# Patient Record
Sex: MALE | Race: WHITE | NOT HISPANIC OR LATINO | Employment: UNEMPLOYED | URBAN - METROPOLITAN AREA
[De-identification: names, ages, dates, MRNs, and addresses within clinical notes are randomized per-mention and may not be internally consistent; named-entity substitution may affect disease eponyms.]

---

## 2017-01-16 DIAGNOSIS — E08.40 DIABETES MELLITUS DUE TO UNDERLYING CONDITION WITH DIABETIC NEUROPATHY, WITH LONG-TERM CURRENT USE OF INSULIN (HCC): ICD-10-CM

## 2017-01-16 DIAGNOSIS — Z79.4 DIABETES MELLITUS DUE TO UNDERLYING CONDITION WITH DIABETIC NEUROPATHY, WITH LONG-TERM CURRENT USE OF INSULIN (HCC): ICD-10-CM

## 2017-02-14 RX ORDER — GABAPENTIN 800 MG/1
TABLET ORAL
Qty: 90 TAB | Refills: 2 | Status: SHIPPED | OUTPATIENT
Start: 2017-02-14 | End: 2017-06-28 | Stop reason: SDUPTHER

## 2017-02-14 RX ORDER — SULFAMETHOXAZOLE AND TRIMETHOPRIM 800; 160 MG/1; MG/1
TABLET ORAL
OUTPATIENT
Start: 2017-02-14

## 2017-03-20 RX ORDER — IBUPROFEN 800 MG/1
TABLET ORAL
Qty: 30 TAB | Refills: 2 | Status: SHIPPED | OUTPATIENT
Start: 2017-03-20 | End: 2017-06-28 | Stop reason: SDUPTHER

## 2017-04-27 NOTE — TELEPHONE ENCOUNTER
Was the patient seen in the last year in this department? Yes     Does patient have an active prescription for medications requested? No     Received Request Via: Pharmacy

## 2017-05-01 RX ORDER — CALCIUM CITRATE/VITAMIN D3 200MG-6.25
TABLET ORAL
Qty: 100 STRIP | Refills: 1 | Status: SHIPPED | OUTPATIENT
Start: 2017-05-01 | End: 2017-08-28

## 2017-05-25 ENCOUNTER — APPOINTMENT (OUTPATIENT)
Dept: RADIOLOGY | Facility: MEDICAL CENTER | Age: 52
End: 2017-05-25
Attending: PHYSICIAN ASSISTANT
Payer: MEDICAID

## 2017-05-30 DIAGNOSIS — E11.42 DIABETIC POLYNEUROPATHY ASSOCIATED WITH TYPE 2 DIABETES MELLITUS (HCC): ICD-10-CM

## 2017-05-30 RX ORDER — PREGABALIN 100 MG/1
100 CAPSULE ORAL 2 TIMES DAILY
Qty: 60 CAP | Refills: 3 | Status: SHIPPED | OUTPATIENT
Start: 2017-05-30 | End: 2017-10-16 | Stop reason: SDUPTHER

## 2017-05-30 RX ORDER — GABAPENTIN 800 MG/1
800 TABLET ORAL 3 TIMES DAILY
Qty: 90 TAB | Refills: 2 | Status: SHIPPED | OUTPATIENT
Start: 2017-05-30 | End: 2017-08-28 | Stop reason: SDUPTHER

## 2017-05-30 NOTE — TELEPHONE ENCOUNTER
Was the patient seen in the last year in this department? Yes     Does patient have an active prescription for medications requested? No     Received Request Via: Pharmacy   Future Appointments       Provider Department [place]    6/15/2017 11:30 AM Jesenia VERGARA  2 [facility] IMAG[facility] - ULTRASOUND - [address]

## 2017-05-31 NOTE — TELEPHONE ENCOUNTER
Was the patient seen in the last year in this department? Yes     Does patient have an active prescription for medications requested? No     Received Request Via: Pharmacy   Future Appointments       Provider Department [place]    6/15/2017 11:30 AM Jesenia VERGARA  2 [facility] - ULTRASOUND - [address]

## 2017-06-15 ENCOUNTER — HOSPITAL ENCOUNTER (OUTPATIENT)
Dept: RADIOLOGY | Facility: MEDICAL CENTER | Age: 52
End: 2017-06-15
Attending: PHYSICIAN ASSISTANT
Payer: MEDICAID

## 2017-06-15 DIAGNOSIS — B18.2 HEP C W/O COMA, CHRONIC (HCC): ICD-10-CM

## 2017-06-15 PROCEDURE — 76705 ECHO EXAM OF ABDOMEN: CPT

## 2017-06-28 RX ORDER — PEN NEEDLE, DIABETIC 32GX 5/32"
NEEDLE, DISPOSABLE MISCELLANEOUS
Qty: 100 EACH | Refills: 2 | Status: SHIPPED | OUTPATIENT
Start: 2017-06-28 | End: 2017-12-18 | Stop reason: SDUPTHER

## 2017-06-28 RX ORDER — INSULIN GLARGINE 100 [IU]/ML
INJECTION, SOLUTION SUBCUTANEOUS
Qty: 1 PEN | Refills: 2 | Status: SHIPPED | OUTPATIENT
Start: 2017-06-28 | End: 2017-08-28

## 2017-06-28 RX ORDER — GABAPENTIN 800 MG/1
TABLET ORAL
Qty: 90 TAB | Refills: 1 | Status: SHIPPED | OUTPATIENT
Start: 2017-06-28 | End: 2017-08-28

## 2017-06-28 RX ORDER — IBUPROFEN 800 MG/1
TABLET ORAL
Qty: 30 TAB | Refills: 1 | Status: SHIPPED | OUTPATIENT
Start: 2017-06-28 | End: 2017-08-07 | Stop reason: SDUPTHER

## 2017-07-14 ENCOUNTER — HOSPITAL ENCOUNTER (OUTPATIENT)
Dept: LAB | Facility: MEDICAL CENTER | Age: 52
End: 2017-07-14
Attending: PHYSICIAN ASSISTANT
Payer: MEDICAID

## 2017-07-14 LAB
ALBUMIN SERPL BCP-MCNC: 4 G/DL (ref 3.2–4.9)
ALBUMIN/GLOB SERPL: 1.1 G/DL
ALP SERPL-CCNC: 120 U/L (ref 30–99)
ALT SERPL-CCNC: 15 U/L (ref 2–50)
ANION GAP SERPL CALC-SCNC: 11 MMOL/L (ref 0–11.9)
AST SERPL-CCNC: 18 U/L (ref 12–45)
BILIRUB SERPL-MCNC: 0.9 MG/DL (ref 0.1–1.5)
BUN SERPL-MCNC: 15 MG/DL (ref 8–22)
CALCIUM SERPL-MCNC: 9.5 MG/DL (ref 8.5–10.5)
CHLORIDE SERPL-SCNC: 101 MMOL/L (ref 96–112)
CO2 SERPL-SCNC: 23 MMOL/L (ref 20–33)
CREAT SERPL-MCNC: 1.49 MG/DL (ref 0.5–1.4)
GFR SERPL CREATININE-BSD FRML MDRD: 50 ML/MIN/1.73 M 2
GLOBULIN SER CALC-MCNC: 3.5 G/DL (ref 1.9–3.5)
GLUCOSE SERPL-MCNC: 177 MG/DL (ref 65–99)
POTASSIUM SERPL-SCNC: 3.6 MMOL/L (ref 3.6–5.5)
PROT SERPL-MCNC: 7.5 G/DL (ref 6–8.2)
SODIUM SERPL-SCNC: 135 MMOL/L (ref 135–145)

## 2017-07-14 PROCEDURE — 80053 COMPREHEN METABOLIC PANEL: CPT

## 2017-07-14 PROCEDURE — 36415 COLL VENOUS BLD VENIPUNCTURE: CPT

## 2017-07-19 ENCOUNTER — HOSPITAL ENCOUNTER (OUTPATIENT)
Dept: RADIOLOGY | Facility: MEDICAL CENTER | Age: 52
End: 2017-07-19
Attending: PHYSICIAN ASSISTANT
Payer: MEDICAID

## 2017-07-19 DIAGNOSIS — R93.2 NONVISUALIZATION OF GALLBLADDER: ICD-10-CM

## 2017-07-19 PROCEDURE — 74181 MRI ABDOMEN W/O CONTRAST: CPT

## 2017-08-07 RX ORDER — IBUPROFEN 800 MG/1
TABLET ORAL
Qty: 30 TAB | Refills: 0 | Status: SHIPPED | OUTPATIENT
Start: 2017-08-07 | End: 2017-10-16 | Stop reason: SDUPTHER

## 2017-08-28 ENCOUNTER — OFFICE VISIT (OUTPATIENT)
Dept: MEDICAL GROUP | Facility: MEDICAL CENTER | Age: 52
End: 2017-08-28
Attending: NURSE PRACTITIONER
Payer: MEDICAID

## 2017-08-28 VITALS
RESPIRATION RATE: 16 BRPM | OXYGEN SATURATION: 94 % | BODY MASS INDEX: 23.06 KG/M2 | HEIGHT: 73 IN | TEMPERATURE: 98.1 F | WEIGHT: 174 LBS | DIASTOLIC BLOOD PRESSURE: 60 MMHG | SYSTOLIC BLOOD PRESSURE: 100 MMHG | HEART RATE: 84 BPM

## 2017-08-28 DIAGNOSIS — Z23 NEED FOR TDAP VACCINATION: ICD-10-CM

## 2017-08-28 DIAGNOSIS — Z91.09 ENVIRONMENTAL ALLERGIES: ICD-10-CM
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
## 2017-08-28 DIAGNOSIS — L08.9 SKIN INFECTION: ICD-10-CM

## 2017-08-28 DIAGNOSIS — N40.0 BENIGN NON-NODULAR PROSTATIC HYPERPLASIA, PRESENCE OF LOWER URINARY TRACT SYMPTOMS UNSPECIFIED: ICD-10-CM

## 2017-08-28 DIAGNOSIS — F33.9 EPISODE OF RECURRENT MAJOR DEPRESSIVE DISORDER, UNSPECIFIED DEPRESSION EPISODE SEVERITY (HCC): ICD-10-CM

## 2017-08-28 DIAGNOSIS — F51.01 PRIMARY INSOMNIA: ICD-10-CM

## 2017-08-28 DIAGNOSIS — Z12.11 SCREEN FOR COLON CANCER: ICD-10-CM

## 2017-08-28 DIAGNOSIS — E11.9 TYPE 2 DIABETES MELLITUS WITHOUT COMPLICATION, UNSPECIFIED LONG TERM INSULIN USE STATUS: ICD-10-CM

## 2017-08-28 LAB — GLUCOSE BLD-MCNC: 421 MG/DL (ref 70–100)

## 2017-08-28 PROCEDURE — 90471 IMMUNIZATION ADMIN: CPT | Performed by: NURSE PRACTITIONER

## 2017-08-28 PROCEDURE — 90715 TDAP VACCINE 7 YRS/> IM: CPT | Performed by: NURSE PRACTITIONER

## 2017-08-28 PROCEDURE — 82948 REAGENT STRIP/BLOOD GLUCOSE: CPT | Performed by: NURSE PRACTITIONER

## 2017-08-28 PROCEDURE — 99213 OFFICE O/P EST LOW 20 MIN: CPT | Mod: 25 | Performed by: NURSE PRACTITIONER

## 2017-08-28 PROCEDURE — 99214 OFFICE O/P EST MOD 30 MIN: CPT | Mod: 25 | Performed by: NURSE PRACTITIONER

## 2017-08-28 RX ORDER — SULFAMETHOXAZOLE AND TRIMETHOPRIM 800; 160 MG/1; MG/1
1 TABLET ORAL 2 TIMES DAILY
Qty: 20 TAB | Refills: 0 | Status: SHIPPED | OUTPATIENT
Start: 2017-08-28 | End: 2017-10-16

## 2017-08-28 RX ORDER — GABAPENTIN 800 MG/1
800 TABLET ORAL 3 TIMES DAILY
Qty: 90 TAB | Refills: 2 | Status: SHIPPED | OUTPATIENT
Start: 2017-08-28 | End: 2017-10-16 | Stop reason: SDUPTHER

## 2017-08-28 RX ORDER — TAMSULOSIN HYDROCHLORIDE 0.4 MG/1
0.8 CAPSULE ORAL
Qty: 60 CAP | Refills: 5 | Status: SHIPPED | OUTPATIENT
Start: 2017-08-28 | End: 2017-10-16 | Stop reason: SDUPTHER

## 2017-08-28 RX ORDER — FLUTICASONE PROPIONATE 50 MCG
1 SPRAY, SUSPENSION (ML) NASAL DAILY
Qty: 16 G | Refills: 11 | Status: SHIPPED | OUTPATIENT
Start: 2017-08-28 | End: 2017-10-16 | Stop reason: SDUPTHER

## 2017-08-28 RX ORDER — HYDROXYZINE HYDROCHLORIDE 25 MG/1
25 TABLET, FILM COATED ORAL NIGHTLY PRN
Qty: 30 TAB | Refills: 2 | Status: SHIPPED | OUTPATIENT
Start: 2017-08-28 | End: 2017-10-16 | Stop reason: SDUPTHER

## 2017-08-28 ASSESSMENT — PATIENT HEALTH QUESTIONNAIRE - PHQ9
SUM OF ALL RESPONSES TO PHQ QUESTIONS 1-9: 26
5. POOR APPETITE OR OVEREATING: 3 - NEARLY EVERY DAY
CLINICAL INTERPRETATION OF PHQ2 SCORE: 6

## 2017-08-28 ASSESSMENT — PAIN SCALES - GENERAL: PAINLEVEL: NO PAIN

## 2017-08-28 NOTE — ASSESSMENT & PLAN NOTE
"Jose reports has had multiple spider bites to left arm.  Has had some redness to forearm,   Denies fever. \"Lanced it\" and pus came out.  Is less in size.  "

## 2017-08-28 NOTE — ASSESSMENT & PLAN NOTE
Reports increased stress.  Is not sleeping well.  Discussed avoiding caffeine  Will trial Atarax.  Pt not interested in counseling or Psychiatry despite my recommendation  Due to his insomnia and depression.  Denies suicidal ideation.

## 2017-08-28 NOTE — PROGRESS NOTES
"  Chief Complaint: f/u on DM, Med refills, \"spider bite\"    HPI:  Jose presents to the clinic after a long absence (last seen 16) for follow up on DM  And \"spider bite\",    His PMH includes    Depression  Poorly Controlled DM-2  Diabetic Neuropathy  Erectile Dysfunction  Constipation  Low Back Pain  BPH and Rectal Pain     Hepatitis C, Treated by GI consultants  GERD  Elevated LFT's     Meth and Marijuana Abuse hx     Rash     Established with   Endocrine    GI Consultants  [facility] [place]- Maurilio Tariq     Review of Records shows  16 Clinic visit, Tx for Bronchitis  16 Visit with [facility]- Reports 10 days left on 24 wk tx for hep C tx. Rash resolving F/u 4 months    Nexium 40 mg Daily RX renewal.  16 Clinic visit for Refill of Insulin and Lyrica RX    REsults REview:  16 Last A1c= 8.1 ( ~ 186).   17 BS= 177  17 MRI ABd shows Dilated pancreatic duct.     [place]  Report shows  No entries.      Diabetes mellitus type 2, uncontrolled  Pt last A1c= 8.1 on 17.  He reports he has not been taking very good care of self due to depressed as wife passed away last month. Dog passed away last week.  Today is 421= Bs in clinic today.  Pt reprots not taking meds.  S/S  Basaglar pen 35 at hs, S/S humalog.      Insect bite  Jose reports has had multiple spider bites to left arm.  Has had some redness to forearm,   Denies fever. \"Lanced it\" and pus came out.  Is less in size.    Depression  Pt has a hx of depression.  He has been tx w Lexapro. States not taking for many months.  States it did not help much.  He reports his wife  last month  And his dog  also recently.  He scores high on depression score in clinic today.  Denies suicidal ideation, but states not very motivated  To take his DM meds or check his BS.  Pt offered  Both Psychiatry and Psychology referral but not interested.  I recommend re-start of anti-depressant, but Pt not wanting to.  I will refer to [facility] " in hopes Pt will change his mind about   Talking and counseling about his wife and depression.    Primary insomnia  Reports increased stress.  Is not sleeping well.  Discussed avoiding caffeine  Will trial Atarax.  Pt not interested in counseling or Psychiatry despite my recommendation  Due to his insomnia and depression.  Denies suicidal ideation.      Patient Active Problem List    Diagnosis Date Noted   • Diabetes mellitus type 2, uncontrolled (CMS-HCC) 03/11/2011     Priority: Medium   • Insect bite 08/28/2017   • Primary insomnia 08/28/2017   • Wound of left upper extremity 08/25/2016   • Cough 05/18/2016   • Vitamin D deficiency 10/14/2015   • Tobacco use 09/24/2015   • Diabetic polyneuropathy associated with type 2 diabetes mellitus (CMS-HCC) 09/24/2015   • Hyponatremia 08/15/2015   • Upper back pain 03/03/2015   • Depression 12/07/2012   • Erectile dysfunction 12/07/2012   • Hyperlipidemia 12/07/2012   • Hep C w/o coma, chronic (CMS-HCC) 02/23/2011   • Elevated liver enzymes 02/23/2011   • GERD (gastroesophageal reflux disease) 02/23/2011   • Constipation, chronic 02/23/2011   • BPH (benign prostatic hyperplasia) 02/23/2011   • DM (diabetes mellitus) (CMS-HCC) 02/23/2011       Allergies:Nkda [no known drug allergy]    Current Outpatient Prescriptions   Medication Sig Dispense Refill   • fluticasone (FLONASE ALLERGY RELIEF) 50 MCG/ACT nasal spray Spray 1 Spray in nose every day. 16 g 11   • tamsulosin (FLOMAX) 0.4 MG capsule Take 2 Caps by mouth ONE-HALF HOUR AFTER BREAKFAST. 60 Cap 5   • gabapentin (NEURONTIN) 800 MG tablet Take 1 Tab by mouth 3 times a day. 90 Tab 2   • sulfamethoxazole-trimethoprim (BACTRIM DS) 800-160 MG tablet Take 1 Tab by mouth 2 times a day. 20 Tab 0   • hydrOXYzine (ATARAX) 25 MG Tab Take 1 Tab by mouth at bedtime as needed. 30 Tab 2   • ibuprofen (MOTRIN) 800 MG Tab TAKE 1/2 TABLET BY MOUTH EVERY 12 HOURS IF NEEDED 30 Tab 0   • esomeprazole (NEXIUM) 20 MG capsule TAKE 1 CAPSULE  "ORALLY EVERY MORNING BEFORE BREAKFAST 30 Cap 4   • BD PEN NEEDLE JACEY U/F 32G X 4 MM Misc USE AS DIRECTED WITH INSULIN 100 Each 2   • insulin glargine (BASAGLAR KWIKPEN) 100 UNIT/ML Solution Pen-injector injection Inject 35 Units as instructed every evening. 3 PEN 3   • pregabalin (LYRICA) 100 MG Cap Take 1 Cap by mouth 2 times a day. 60 Cap 3   • Insulin Pen Needle 29G X 12.7MM Misc #100 pen needles to go with Insulin pens 100 Each 3   • glucose blood (TRUE METRIX BLOOD GLUCOSE TEST) strip 1 Strip by Other route 4 times a day. 120 Strip 5   • insulin regular (HUMULIN R) 100 Unit/mL Solution Sliding scale as per Endocrine 10 mL 10   • amitriptyline (ELAVIL) 50 MG Tab Take 1 Tab by mouth every bedtime. 30 Tab 10   • Lancets Misc by Does not apply route 2 Times a Day. ... 60 Each 11   • metformin (GLUCOPHAGE) 1000 MG tablet TAKE 1 TABLET ORALLY 2 TIMES DAILY 60 Tab 3   • INSULIN SYRINGE 1CC/29G 29G X 1/2\" 1 ML MISC For use with lantus 30 Each 11     No current facility-administered medications for this visit.        Social History   Substance Use Topics   • Smoking status: Current Every Day Smoker     Packs/day: 0.50     Years: 25.00     Types: Cigarettes   • Smokeless tobacco: Never Used      Comment: 1/2 ppd since 1983   • Alcohol use No       Family History   Problem Relation Age of Onset   • Cancer Mother    • Cancer Father    • Heart Disease Brother    • Lung Disease Neg Hx    • Diabetes Neg Hx    • Stroke Neg Hx        ROS:  Review of Systems   See HPI Above        Exam:  Blood pressure 100/60, pulse 84, temperature 36.7 °C (98.1 °F), resp. rate 16, height 1.854 m (6' 0.99\"), weight 78.9 kg (174 lb), SpO2 94 %.  General:  Well nourished, well developed male in NAD  HENT:Head is grossly normal. PERRL.  Neck: Supple. Trachea is midline.  Pulmonary: Clear to ausculation .  Normal effort. No rales, ronchi, or wheezing.   Cardiovascular: Regular rate and rhythm.  Abdomen-Abdomen is soft, No tenderness.  Upper " extremities- Strong = . Good ROM. Small amt of redness to left forearm, with scab. No drainage, mild tenderness.  No tenderness to left axilla.  Lower extremities- neg for edema, redness, tenderness.  Neuro- A & O x 4. Speech clear and appropriate.  Psych-Sad, tired affect.      Current medications, allergies, and problem list reviewed with patient and updated in  Psychiatric today.    Assessment/Plan:  1. Type 2 diabetes mellitus without complication, unspecified long term insulin use status (CMS-HCC)  POCT Glucose= 421 in clinic today  Long discussion w patient about re-starting checking his fasting am BS and starting his Basaglar at night and working towards adding back in his S/S insulin with meals.    COMP METABOLIC PANEL    HEMOGLOBIN A1C    LIPID PROFILE    MICROALBUMIN CREAT RATIO URINE (LAB COLLECT)   2. Screen for colon cancer  OCCULT BLOOD FECES IMMUNOASSAY   3. Insect bite, initial encounter     4. Environmental allergies  fluticasone (FLONASE ALLERGY RELIEF) 50 MCG/ACT nasal spray   5. Benign non-nodular prostatic hyperplasia, presence of lower urinary tract symptoms unspecified  tamsulosin (FLOMAX) 0.4 MG capsule  Increase in dose to 0.8 mg/day as Pt states in past his Urologist, Maurilio had increased his dose to this and it works better.   6. Need for Tdap vaccination  TDAP VACCINE =>6YO IM   7. Skin infection  sulfamethoxazole-trimethoprim (BACTRIM DS) 800-160 MG tablet  Wound care referred.  Polysporin and bandaide to left forearm red area.   8. Primary insomnia  hydrOXYzine (ATARAX) 25 MG Tab    REFERRAL TO PSYCHOLOGY-[facility]   9. Episode of recurrent major depressive disorder, unspecified depression episode severity (CMS-HCC)  Patient has been identified as being depressed and appropriate orders and counseling have been given    REFERRAL TO PSYCHOLOGY   Follow up in 6 weeks to review labs and f/u on his depression.. Call or return if questions, concerns, or worsening condition.

## 2017-08-28 NOTE — ASSESSMENT & PLAN NOTE
Pt has a hx of depression.  He has been tx w Lexapro. States not taking for many months.  States it did not help much.  He reports his wife  last month  And his dog  also recently.  He scores high on depression score in clinic today.

## 2017-08-28 NOTE — ASSESSMENT & PLAN NOTE
Pt last A1c= 8.1 on 2/23/17.  He reports he has not been taking very good care of self due to depressed as wife passed away last month. Dog passed away last week.  Today is 421= Bs in clinic today.  Pt reprots not taking meds.  S/S  Basaglar pen 35 at hs, S/S humalog.

## 2017-10-11 ENCOUNTER — HOSPITAL ENCOUNTER (OUTPATIENT)
Dept: LAB | Facility: MEDICAL CENTER | Age: 52
End: 2017-10-11
Attending: PHYSICIAN ASSISTANT
Payer: MEDICAID

## 2017-10-11 ENCOUNTER — HOSPITAL ENCOUNTER (OUTPATIENT)
Dept: LAB | Facility: MEDICAL CENTER | Age: 52
End: 2017-10-11
Attending: NURSE PRACTITIONER
Payer: MEDICAID

## 2017-10-11 DIAGNOSIS — E11.9 TYPE 2 DIABETES MELLITUS WITHOUT COMPLICATION, UNSPECIFIED LONG TERM INSULIN USE STATUS: ICD-10-CM

## 2017-10-11 LAB
ALBUMIN SERPL BCP-MCNC: 3.8 G/DL (ref 3.2–4.9)
ALBUMIN SERPL BCP-MCNC: 3.9 G/DL (ref 3.2–4.9)
ALBUMIN/GLOB SERPL: 1.2 G/DL
ALBUMIN/GLOB SERPL: 1.2 G/DL
ALP SERPL-CCNC: 117 U/L (ref 30–99)
ALP SERPL-CCNC: 117 U/L (ref 30–99)
ALT SERPL-CCNC: 12 U/L (ref 2–50)
ALT SERPL-CCNC: 12 U/L (ref 2–50)
ANION GAP SERPL CALC-SCNC: 7 MMOL/L (ref 0–11.9)
ANION GAP SERPL CALC-SCNC: 8 MMOL/L (ref 0–11.9)
AST SERPL-CCNC: 14 U/L (ref 12–45)
AST SERPL-CCNC: 15 U/L (ref 12–45)
BASOPHILS # BLD AUTO: 0.8 % (ref 0–1.8)
BASOPHILS # BLD: 0.06 K/UL (ref 0–0.12)
BILIRUB SERPL-MCNC: 0.6 MG/DL (ref 0.1–1.5)
BILIRUB SERPL-MCNC: 0.7 MG/DL (ref 0.1–1.5)
BUN SERPL-MCNC: 13 MG/DL (ref 8–22)
BUN SERPL-MCNC: 13 MG/DL (ref 8–22)
CALCIUM SERPL-MCNC: 9.3 MG/DL (ref 8.5–10.5)
CALCIUM SERPL-MCNC: 9.4 MG/DL (ref 8.5–10.5)
CHLORIDE SERPL-SCNC: 101 MMOL/L (ref 96–112)
CHLORIDE SERPL-SCNC: 101 MMOL/L (ref 96–112)
CHOLEST SERPL-MCNC: 135 MG/DL (ref 100–199)
CO2 SERPL-SCNC: 24 MMOL/L (ref 20–33)
CO2 SERPL-SCNC: 25 MMOL/L (ref 20–33)
CREAT SERPL-MCNC: 1.18 MG/DL (ref 0.5–1.4)
CREAT SERPL-MCNC: 1.23 MG/DL (ref 0.5–1.4)
CREAT UR-MCNC: 66.5 MG/DL
EOSINOPHIL # BLD AUTO: 0.11 K/UL (ref 0–0.51)
EOSINOPHIL NFR BLD: 1.4 % (ref 0–6.9)
ERYTHROCYTE [DISTWIDTH] IN BLOOD BY AUTOMATED COUNT: 44.9 FL (ref 35.9–50)
EST. AVERAGE GLUCOSE BLD GHB EST-MCNC: 280 MG/DL
GFR SERPL CREATININE-BSD FRML MDRD: >60 ML/MIN/1.73 M 2
GFR SERPL CREATININE-BSD FRML MDRD: >60 ML/MIN/1.73 M 2
GLOBULIN SER CALC-MCNC: 3.3 G/DL (ref 1.9–3.5)
GLOBULIN SER CALC-MCNC: 3.3 G/DL (ref 1.9–3.5)
GLUCOSE SERPL-MCNC: 286 MG/DL (ref 65–99)
GLUCOSE SERPL-MCNC: 293 MG/DL (ref 65–99)
HBA1C MFR BLD: 11.4 % (ref 0–5.6)
HCT VFR BLD AUTO: 59.6 % (ref 42–52)
HDLC SERPL-MCNC: 47 MG/DL
HGB BLD-MCNC: 20.7 G/DL (ref 14–18)
IMM GRANULOCYTES # BLD AUTO: 0.02 K/UL (ref 0–0.11)
IMM GRANULOCYTES NFR BLD AUTO: 0.3 % (ref 0–0.9)
INR PPP: 1.02 (ref 0.87–1.13)
LDLC SERPL CALC-MCNC: 67 MG/DL
LYMPHOCYTES # BLD AUTO: 2.27 K/UL (ref 1–4.8)
LYMPHOCYTES NFR BLD: 29.3 % (ref 22–41)
MCH RBC QN AUTO: 30.3 PG (ref 27–33)
MCHC RBC AUTO-ENTMCNC: 34.7 G/DL (ref 33.7–35.3)
MCV RBC AUTO: 87.3 FL (ref 81.4–97.8)
MICROALBUMIN UR-MCNC: 19.1 MG/DL
MICROALBUMIN/CREAT UR: 287 MG/G (ref 0–30)
MONOCYTES # BLD AUTO: 0.34 K/UL (ref 0–0.85)
MONOCYTES NFR BLD AUTO: 4.4 % (ref 0–13.4)
NEUTROPHILS # BLD AUTO: 4.94 K/UL (ref 1.82–7.42)
NEUTROPHILS NFR BLD: 63.8 % (ref 44–72)
NRBC # BLD AUTO: 0 K/UL
NRBC BLD AUTO-RTO: 0 /100 WBC
PLATELET # BLD AUTO: 104 K/UL (ref 164–446)
PMV BLD AUTO: 11.2 FL (ref 9–12.9)
POTASSIUM SERPL-SCNC: 4.3 MMOL/L (ref 3.6–5.5)
POTASSIUM SERPL-SCNC: 4.3 MMOL/L (ref 3.6–5.5)
PROT SERPL-MCNC: 7.1 G/DL (ref 6–8.2)
PROT SERPL-MCNC: 7.2 G/DL (ref 6–8.2)
PROTHROMBIN TIME: 13.7 SEC (ref 12–14.6)
RBC # BLD AUTO: 6.83 M/UL (ref 4.7–6.1)
SODIUM SERPL-SCNC: 133 MMOL/L (ref 135–145)
SODIUM SERPL-SCNC: 133 MMOL/L (ref 135–145)
TRIGL SERPL-MCNC: 103 MG/DL (ref 0–149)
WBC # BLD AUTO: 7.7 K/UL (ref 4.8–10.8)

## 2017-10-11 PROCEDURE — 80053 COMPREHEN METABOLIC PANEL: CPT | Mod: 91

## 2017-10-11 PROCEDURE — 83036 HEMOGLOBIN GLYCOSYLATED A1C: CPT

## 2017-10-11 PROCEDURE — 85025 COMPLETE CBC W/AUTO DIFF WBC: CPT

## 2017-10-11 PROCEDURE — 86235 NUCLEAR ANTIGEN ANTIBODY: CPT | Mod: 91

## 2017-10-11 PROCEDURE — 85610 PROTHROMBIN TIME: CPT

## 2017-10-11 PROCEDURE — 87522 HEPATITIS C REVRS TRNSCRPJ: CPT

## 2017-10-11 PROCEDURE — 80053 COMPREHEN METABOLIC PANEL: CPT

## 2017-10-11 PROCEDURE — 86038 ANTINUCLEAR ANTIBODIES: CPT

## 2017-10-11 PROCEDURE — 36415 COLL VENOUS BLD VENIPUNCTURE: CPT

## 2017-10-11 PROCEDURE — 86225 DNA ANTIBODY NATIVE: CPT

## 2017-10-11 PROCEDURE — 80061 LIPID PANEL: CPT

## 2017-10-11 PROCEDURE — 82043 UR ALBUMIN QUANTITATIVE: CPT

## 2017-10-11 PROCEDURE — 82570 ASSAY OF URINE CREATININE: CPT

## 2017-10-12 DIAGNOSIS — D75.1 POLYCYTHEMIA: ICD-10-CM

## 2017-10-12 DIAGNOSIS — D69.6 THROMBOCYTOPENIA (HCC): ICD-10-CM

## 2017-10-13 LAB
HCV RNA SERPL NAA+PROBE-ACNC: NORMAL IU/ML
NUCLEAR IGG SER QL IA: NORMAL
TEST INFO  93644: NORMAL

## 2017-10-16 ENCOUNTER — OFFICE VISIT (OUTPATIENT)
Dept: MEDICAL GROUP | Facility: MEDICAL CENTER | Age: 52
End: 2017-10-16
Attending: NURSE PRACTITIONER
Payer: MEDICAID

## 2017-10-16 VITALS
HEIGHT: 73 IN | RESPIRATION RATE: 16 BRPM | OXYGEN SATURATION: 96 % | HEART RATE: 94 BPM | TEMPERATURE: 97.9 F | BODY MASS INDEX: 23.59 KG/M2 | SYSTOLIC BLOOD PRESSURE: 102 MMHG | DIASTOLIC BLOOD PRESSURE: 70 MMHG | WEIGHT: 178 LBS

## 2017-10-16 DIAGNOSIS — Z79.4 DIABETES MELLITUS DUE TO UNDERLYING CONDITION WITH DIABETIC NEUROPATHY, WITH LONG-TERM CURRENT USE OF INSULIN (HCC): ICD-10-CM

## 2017-10-16 DIAGNOSIS — E11.42 DIABETIC POLYNEUROPATHY ASSOCIATED WITH TYPE 2 DIABETES MELLITUS (HCC): ICD-10-CM

## 2017-10-16 DIAGNOSIS — L29.9 ITCHY SKIN: ICD-10-CM

## 2017-10-16 DIAGNOSIS — F51.01 PRIMARY INSOMNIA: ICD-10-CM

## 2017-10-16 DIAGNOSIS — Z91.09 ENVIRONMENTAL ALLERGIES: ICD-10-CM

## 2017-10-16 DIAGNOSIS — K21.00 GASTROESOPHAGEAL REFLUX DISEASE WITH ESOPHAGITIS: ICD-10-CM

## 2017-10-16 DIAGNOSIS — F32.89 OTHER DEPRESSION: ICD-10-CM

## 2017-10-16 DIAGNOSIS — Z23 NEED FOR PNEUMOCOCCAL VACCINE: ICD-10-CM

## 2017-10-16 DIAGNOSIS — E08.40 DIABETES MELLITUS DUE TO UNDERLYING CONDITION WITH DIABETIC NEUROPATHY, WITH LONG-TERM CURRENT USE OF INSULIN (HCC): ICD-10-CM

## 2017-10-16 DIAGNOSIS — Z23 NEED FOR INFLUENZA VACCINATION: ICD-10-CM

## 2017-10-16 PROCEDURE — 90471 IMMUNIZATION ADMIN: CPT | Performed by: NURSE PRACTITIONER

## 2017-10-16 PROCEDURE — 99214 OFFICE O/P EST MOD 30 MIN: CPT | Mod: 25 | Performed by: NURSE PRACTITIONER

## 2017-10-16 PROCEDURE — 90686 IIV4 VACC NO PRSV 0.5 ML IM: CPT | Performed by: NURSE PRACTITIONER

## 2017-10-16 PROCEDURE — 99213 OFFICE O/P EST LOW 20 MIN: CPT | Mod: 25 | Performed by: NURSE PRACTITIONER

## 2017-10-16 PROCEDURE — 90732 PPSV23 VACC 2 YRS+ SUBQ/IM: CPT | Performed by: NURSE PRACTITIONER

## 2017-10-16 RX ORDER — TAMSULOSIN HYDROCHLORIDE 0.4 MG/1
0.8 CAPSULE ORAL
Qty: 60 CAP | Refills: 5 | Status: SHIPPED | OUTPATIENT
Start: 2017-10-16

## 2017-10-16 RX ORDER — PREGABALIN 100 MG/1
100 CAPSULE ORAL 2 TIMES DAILY
Qty: 60 CAP | Refills: 3 | Status: SHIPPED | OUTPATIENT
Start: 2017-10-16 | End: 2017-12-18 | Stop reason: SDUPTHER

## 2017-10-16 RX ORDER — AMITRIPTYLINE HYDROCHLORIDE 50 MG/1
50 TABLET, FILM COATED ORAL
Qty: 30 TAB | Refills: 10 | Status: SHIPPED | OUTPATIENT
Start: 2017-10-16

## 2017-10-16 RX ORDER — FLUTICASONE PROPIONATE 50 MCG
1 SPRAY, SUSPENSION (ML) NASAL DAILY
Qty: 16 G | Refills: 11 | Status: SHIPPED | OUTPATIENT
Start: 2017-10-16

## 2017-10-16 RX ORDER — LORATADINE 10 MG/1
10 TABLET ORAL DAILY
Qty: 30 TAB | Refills: 2 | Status: SHIPPED | OUTPATIENT
Start: 2017-10-16 | End: 2017-12-18 | Stop reason: SDUPTHER

## 2017-10-16 RX ORDER — IBUPROFEN 800 MG/1
400 TABLET ORAL EVERY 12 HOURS PRN
Qty: 30 TAB | Refills: 0 | Status: SHIPPED | OUTPATIENT
Start: 2017-10-16 | End: 2017-11-27 | Stop reason: SDUPTHER

## 2017-10-16 RX ORDER — HYDROXYZINE HYDROCHLORIDE 25 MG/1
25 TABLET, FILM COATED ORAL NIGHTLY PRN
Qty: 30 TAB | Refills: 2 | Status: SHIPPED | OUTPATIENT
Start: 2017-10-16 | End: 2017-12-18 | Stop reason: SDUPTHER

## 2017-10-16 RX ORDER — GABAPENTIN 800 MG/1
800 TABLET ORAL 3 TIMES DAILY
Qty: 90 TAB | Refills: 2 | Status: SHIPPED | OUTPATIENT
Start: 2017-10-16 | End: 2017-12-18 | Stop reason: SDUPTHER

## 2017-10-16 RX ORDER — TRAZODONE HYDROCHLORIDE 50 MG/1
50-100 TABLET ORAL NIGHTLY PRN
Qty: 60 TAB | Refills: 1 | Status: SHIPPED | OUTPATIENT
Start: 2017-10-16 | End: 2017-12-18 | Stop reason: SDUPTHER

## 2017-10-16 NOTE — PROGRESS NOTES
"    Chief Complaint: Trouble sleeping, REsults of Labs    HPI:  Jose presents to the clinic for lab results and f/u on DM.    His PMH includes    Depression  Poorly Controlled DM-2  Diabetic Neuropathy  Erectile Dysfunction  Constipation  Low Back Pain  BPH and Rectal Pain     Hepatitis C, Treated by GI consultants  GERD  Elevated LFT's  Meth and Marijuana Abuse hx    Rash     Previously Established with   Endocrine    [facility]  [facility] [place]- Maurilio Tariq     Recent REferral Approved-   Psyhcology- Counseling- [facility]  Hematology- [facility]      Review of Records shows  10/12/17 Referral placed to Hematologist for Jose's Polycythemia, Thrombocytopenia  17 Clinic visit for f/u on poorly controlled DM, Med Refills, Recent death of family member and Pt not taking care of self. FSBS in clinic= 401.  Labs ordered. Pt to restart DM Meds ( metformin, S/S INsulin, nightly Basaglar. Bactrim DS for spider bite wound, Atarax RX for sleep.  Referred to [facility] for counseling ( depression/Grief). Labs ordered.  16 Clinic visit, Tx for Bronchitis  16 Visit with [facility]- Reports 10 days left on 24 wk tx for hep C tx. Rash resolving F/u 4 months    Nexium 40 mg Daily RX renewal.  16 Clinic visit for Refill of Insulin and Lyrica RX     REsults REview:  10/11/17 Labs-  CBC shows Hgb= 20.7, Hct= 59.6, Platelets= 104 ( Polycythemia, Thrombocytopenia).                              CMP normal except sodium= 133, BS= 293, Alk Phos= 117. A1C= 11.4 ( ~ 280 avg BS)                              Microalb/Cr urine Ratio elevated= 287, Cholesterol Total= 135, Zgiiavkr=618, HDL= 47, LDl= 67    16 Last A1c= 8.1 ( ~ 186).   17 BS= 177  17 MRI ABd shows Dilated pancreatic duct.     [place]  Report shows  No entries.    Depression  Pt has been depressed and worse since his wife  in July.  Reports \"doing okay\". Not wanting medication for depression.  Is taking Elavil 50 " mg at hs for DM neuropathy pains.  Reports is having trouble sleeping.  Denies suicidal ideation.      Primary insomnia  Pt reported poor sleep and depression. Grief at August visit.  Not amenable to Psychiatry or counseling.  RX for Atarax. REferral for counseling placed anyway by me.    Jose Reports Hydroxyzine did not help w sleep.  States Elavil helps him sleep but not to get to sleep.    Diabetes mellitus type 2, uncontrolled  We reviewed his labs including high fasting BS and high A1c.  Pt reports not taking Metformin most days. Does not like the way it makes you feeling. Is using Lantus 35 units at night. Rare use of Sliding Scale INsulin.    Has not seen Endocrine MD in over 1 1-1/2 yrs.     Hyperlipidemia  We reviewed his recent good Lipid Profile.  Not on Statin.         Patient Active Problem List    Diagnosis Date Noted   • Diabetes mellitus type 2, uncontrolled (CMS-HCC) 03/11/2011     Priority: Medium   • Polycythemia 10/12/2017   • Thrombocytopenia (CMS-HCC) 10/12/2017   • Insect bite 08/28/2017   • Primary insomnia 08/28/2017   • Wound of left upper extremity 08/25/2016   • Cough 05/18/2016   • Vitamin D deficiency 10/14/2015   • Tobacco use 09/24/2015   • Diabetic polyneuropathy associated with type 2 diabetes mellitus (CMS-HCC) 09/24/2015   • Hyponatremia 08/15/2015   • Upper back pain 03/03/2015   • Depression 12/07/2012   • Erectile dysfunction 12/07/2012   • Hyperlipidemia 12/07/2012   • Hep C w/o coma, chronic (CMS-HCC) 02/23/2011   • Elevated liver enzymes 02/23/2011   • GERD (gastroesophageal reflux disease) 02/23/2011   • Constipation, chronic 02/23/2011   • BPH (benign prostatic hyperplasia) 02/23/2011   • DM (diabetes mellitus) (CMS-HCC) 02/23/2011       Allergies:Nkda [no known drug allergy]    Current Outpatient Prescriptions   Medication Sig Dispense Refill   • fluticasone (FLONASE ALLERGY RELIEF) 50 MCG/ACT nasal spray Spray 1 Spray in nose every day. 16 g 11   • tamsulosin (FLOMAX)  "0.4 MG capsule Take 2 Caps by mouth ONE-HALF HOUR AFTER BREAKFAST. 60 Cap 5   • gabapentin (NEURONTIN) 800 MG tablet Take 1 Tab by mouth 3 times a day. 90 Tab 2   • hydrOXYzine HCl (ATARAX) 25 MG Tab Take 1 Tab by mouth at bedtime as needed. 30 Tab 2   • ibuprofen (MOTRIN) 800 MG Tab Take 0.5 Tabs by mouth every 12 hours as needed. 30 Tab 0   • esomeprazole (NEXIUM) 20 MG capsule Take 1 Cap by mouth every day. 30 Cap 5   • insulin glargine (BASAGLAR KWIKPEN) 100 UNIT/ML Solution Pen-injector injection Inject 35 Units as instructed every evening. 3 PEN 3   • pregabalin (LYRICA) 100 MG Cap Take 1 Cap by mouth 2 times a day. 60 Cap 3   • glucose blood (TRUE METRIX BLOOD GLUCOSE TEST) strip 1 Strip by Other route 4 times a day. 120 Strip 5   • insulin regular (HUMULIN R) 100 Unit/mL Solution Sliding scale as per Endocrine 10 mL 10   • amitriptyline (ELAVIL) 50 MG Tab Take 1 Tab by mouth every bedtime. 30 Tab 10   • trazodone (DESYREL) 50 MG Tab Take 1-2 Tabs by mouth at bedtime as needed for Sleep. 60 Tab 1   • loratadine (CLARITIN) 10 MG Tab Take 1 Tab by mouth every day. 30 Tab 2   • BD PEN NEEDLE JACEY U/F 32G X 4 MM Misc USE AS DIRECTED WITH INSULIN 100 Each 2   • Insulin Pen Needle 29G X 12.7MM Misc #100 pen needles to go with Insulin pens 100 Each 3   • Lancets Misc by Does not apply route 2 Times a Day. ... 60 Each 11   • metformin (GLUCOPHAGE) 1000 MG tablet TAKE 1 TABLET ORALLY 2 TIMES DAILY 60 Tab 3   • INSULIN SYRINGE 1CC/29G 29G X 1/2\" 1 ML MISC For use with lantus 30 Each 11     No current facility-administered medications for this visit.        Social History   Substance Use Topics   • Smoking status: Current Every Day Smoker     Packs/day: 0.50     Years: 25.00     Types: Cigarettes   • Smokeless tobacco: Never Used      Comment: 1/2 ppd since 1983   • Alcohol use No       Family History   Problem Relation Age of Onset   • Cancer Mother    • Cancer Father    • Heart Disease Brother    • Lung Disease Neg Hx " "   • Diabetes Neg Hx    • Stroke Neg Hx        ROS:  Review of Systems   See HPI Above        Exam:  Blood pressure 102/70, pulse 94, temperature 36.6 °C (97.9 °F), resp. rate 16, height 1.854 m (6' 0.99\"), weight 80.7 kg (178 lb), SpO2 96 %.  General:  Well nourished, well developed male in NAD  HENT:Head is grossly normal. PERRL.  Neck: Supple. Trachea is midline.  Pulmonary: Clear to ausculation .  Normal effort. No rales, ronchi, or wheezing.   Cardiovascular: Regular rate and rhythm.  Abdomen-Abdomen is soft, No tenderness.  Upper extremities- Strong = . Good ROM  Lower extremities- neg for edema, redness, tenderness.  Neuro- A & O x 4. Speech clear and appropriate.     Current medications, allergies, and problem list reviewed with patient and updated in  [facility] today.    Assessment/Plan:  1. Primary insomnia  hydrOXYzine HCl (ATARAX) 25 MG Tab    REFERRAL TO PSYCHIATRY    trazodone (DESYREL) 50 MG Tab   2. Environmental allergies  fluticasone (FLONASE ALLERGY RELIEF) 50 MCG/ACT nasal spray-REfill   3. Diabetic polyneuropathy associated with type 2 diabetes mellitus (CMS-HCC)  ibuprofen (MOTRIN) 800 MG Tab-REfill    pregabalin (LYRICA) 100 MG Cap-REfill    amitriptyline (ELAVIL) 50 MG Tab-REfill   4. Diabetes mellitus due to underlying condition with diabetic neuropathy, with long-term current use of insulin (CMS-HCC)  insulin glargine (BASAGLAR KWIKPEN) 100 UNIT/ML Solution Pen-injector injection-REfill    glucose blood (TRUE METRIX BLOOD GLUCOSE TEST) strip- REfill    insulin regular (HUMULIN R) 100 Unit/mL Solution-REfill    REFERRAL TO ENDOCRINOLOGY   5. Gastroesophageal reflux disease with esophagitis  esomeprazole (NEXIUM) 20 MG capsule-REfill   6. Need for influenza vaccination  INFLUENZA VACCINE QUAD INJ >3Y(PF)   7. Need for pneumococcal vaccine  Pneumococal Polysaccharide Vaccine 23-Valent =>1yo SQ/IM   8. Other depression  REFERRAL TO PSYCHIATRY  Pt instructed to call [facility] for counseling " appt and importance of this therapy.   9. Itchy skin  loratadine (CLARITIN) 10 MG Tab/Atarax prn.   Follow up in 6 weeks.. Call or return if questions, concerns, or worsening condition.

## 2017-10-16 NOTE — ASSESSMENT & PLAN NOTE
"Pt has been depressed and worse since his wife  in July.  Reports \"doing okay\". Not wanting medication for depression.  Is taking Elavil 50 mg at hs for DM neuropathy pains.  Reports is having trouble sleeping.  Denies suicidal ideation.  "

## 2017-10-16 NOTE — ASSESSMENT & PLAN NOTE
Pt reported poor sleep and depression. Grief at August visit.  Not amenable to Psychiatry or counseling.  RX for Atarax. REferral for counseling placed anyway by me.    Jose Reports Hydroxyzine did not help w sleep.  States Elavil helps him sleep but not to get to sleep.

## 2017-10-16 NOTE — ASSESSMENT & PLAN NOTE
We reviewed his labs including high fasting BS and high A1c.  Pt reports not taking Metformin most days. Does not like the way it makes you feeling. Is using Lantus 35 units at night. Rare use of Sliding Scale INsulin.    Has not seen Endocrine MD in over 1 1-1/2 yrs.

## 2017-10-17 RX ORDER — IBUPROFEN 200 MG
TABLET ORAL
COMMUNITY
End: 2017-10-17 | Stop reason: SDUPTHER

## 2017-10-17 RX ORDER — IBUPROFEN 200 MG
TABLET ORAL
Qty: 30 EACH | Refills: 3 | Status: SHIPPED | OUTPATIENT
Start: 2017-10-17 | End: 2017-12-18 | Stop reason: SDUPTHER

## 2017-11-08 ENCOUNTER — HOSPITAL ENCOUNTER (OUTPATIENT)
Facility: MEDICAL CENTER | Age: 52
End: 2017-11-08
Attending: INTERNAL MEDICINE
Payer: MEDICAID

## 2017-11-08 LAB
ALBUMIN SERPL BCP-MCNC: 3.7 G/DL (ref 3.2–4.9)
ALBUMIN/GLOB SERPL: 1.2 G/DL
ALP SERPL-CCNC: 123 U/L (ref 30–99)
ALT SERPL-CCNC: 18 U/L (ref 2–50)
ANION GAP SERPL CALC-SCNC: 8 MMOL/L (ref 0–11.9)
AST SERPL-CCNC: 14 U/L (ref 12–45)
BILIRUB SERPL-MCNC: 0.5 MG/DL (ref 0.1–1.5)
BUN SERPL-MCNC: 11 MG/DL (ref 8–22)
CALCIUM SERPL-MCNC: 9.2 MG/DL (ref 8.5–10.5)
CHLORIDE SERPL-SCNC: 100 MMOL/L (ref 96–112)
CO2 SERPL-SCNC: 26 MMOL/L (ref 20–33)
CREAT SERPL-MCNC: 1.27 MG/DL (ref 0.5–1.4)
GFR SERPL CREATININE-BSD FRML MDRD: 60 ML/MIN/1.73 M 2
GLOBULIN SER CALC-MCNC: 3.1 G/DL (ref 1.9–3.5)
GLUCOSE SERPL-MCNC: 397 MG/DL (ref 65–99)
LDH SERPL-CCNC: 169 U/L (ref 107–266)
POTASSIUM SERPL-SCNC: 4.3 MMOL/L (ref 3.6–5.5)
PROT SERPL-MCNC: 6.8 G/DL (ref 6–8.2)
SODIUM SERPL-SCNC: 134 MMOL/L (ref 135–145)

## 2017-11-08 PROCEDURE — 82668 ASSAY OF ERYTHROPOIETIN: CPT

## 2017-11-08 PROCEDURE — 83615 LACTATE (LD) (LDH) ENZYME: CPT

## 2017-11-08 PROCEDURE — 80053 COMPREHEN METABOLIC PANEL: CPT

## 2017-11-09 DIAGNOSIS — E11.42 DIABETIC POLYNEUROPATHY ASSOCIATED WITH TYPE 2 DIABETES MELLITUS (HCC): ICD-10-CM

## 2017-11-09 RX ORDER — SYRINGE-NEEDLE,INSULIN,0.5 ML 27GX1/2"
SYRINGE, EMPTY DISPOSABLE MISCELLANEOUS
Qty: 100 EACH | Refills: 5 | Status: SHIPPED | OUTPATIENT
Start: 2017-11-09

## 2017-11-10 LAB — EPO SERPL-ACNC: 18 MU/ML (ref 4–27)

## 2017-11-27 DIAGNOSIS — E11.42 DIABETIC POLYNEUROPATHY ASSOCIATED WITH TYPE 2 DIABETES MELLITUS (HCC): ICD-10-CM

## 2017-11-28 RX ORDER — IBUPROFEN 800 MG/1
TABLET ORAL
Qty: 30 TAB | Refills: 2 | Status: SHIPPED | OUTPATIENT
Start: 2017-11-28

## 2017-11-28 NOTE — TELEPHONE ENCOUNTER
Was the patient seen in the last year in this department? Yes     Does patient have an active prescription for medications requested? No     Received Request Via: Pharmacy   Future Appointments       Provider Department [place]    12/5/2017 9:50 AM JUAN C Gardner [facility]

## 2017-12-18 ENCOUNTER — OFFICE VISIT (OUTPATIENT)
Dept: MEDICAL GROUP | Facility: MEDICAL CENTER | Age: 52
End: 2017-12-18
Attending: NURSE PRACTITIONER
Payer: MEDICAID

## 2017-12-18 VITALS
RESPIRATION RATE: 16 BRPM | HEART RATE: 100 BPM | DIASTOLIC BLOOD PRESSURE: 70 MMHG | SYSTOLIC BLOOD PRESSURE: 102 MMHG | BODY MASS INDEX: 24.12 KG/M2 | WEIGHT: 182 LBS | OXYGEN SATURATION: 95 % | HEIGHT: 73 IN | TEMPERATURE: 97.5 F

## 2017-12-18 DIAGNOSIS — F33.1 MODERATE EPISODE OF RECURRENT MAJOR DEPRESSIVE DISORDER (HCC): ICD-10-CM

## 2017-12-18 DIAGNOSIS — L29.9 ITCHY SKIN: ICD-10-CM

## 2017-12-18 DIAGNOSIS — D75.1 POLYCYTHEMIA: ICD-10-CM

## 2017-12-18 DIAGNOSIS — Z79.4 DIABETES MELLITUS DUE TO UNDERLYING CONDITION WITH DIABETIC NEUROPATHY, WITH LONG-TERM CURRENT USE OF INSULIN (HCC): ICD-10-CM

## 2017-12-18 DIAGNOSIS — G89.29 OTHER CHRONIC PAIN: ICD-10-CM

## 2017-12-18 DIAGNOSIS — F51.01 PRIMARY INSOMNIA: ICD-10-CM

## 2017-12-18 DIAGNOSIS — E08.40 DIABETES MELLITUS DUE TO UNDERLYING CONDITION WITH DIABETIC NEUROPATHY, WITH LONG-TERM CURRENT USE OF INSULIN (HCC): ICD-10-CM

## 2017-12-18 DIAGNOSIS — E11.42 DIABETIC POLYNEUROPATHY ASSOCIATED WITH TYPE 2 DIABETES MELLITUS (HCC): ICD-10-CM

## 2017-12-18 DIAGNOSIS — R06.83 SNORES: ICD-10-CM

## 2017-12-18 PROCEDURE — 99214 OFFICE O/P EST MOD 30 MIN: CPT | Performed by: NURSE PRACTITIONER

## 2017-12-18 PROCEDURE — 99213 OFFICE O/P EST LOW 20 MIN: CPT | Performed by: NURSE PRACTITIONER

## 2017-12-18 RX ORDER — GABAPENTIN 800 MG/1
800 TABLET ORAL 3 TIMES DAILY
Qty: 90 TAB | Refills: 2 | Status: SHIPPED | OUTPATIENT
Start: 2017-12-18

## 2017-12-18 RX ORDER — TRAZODONE HYDROCHLORIDE 50 MG/1
50-100 TABLET ORAL NIGHTLY PRN
Qty: 60 TAB | Refills: 2 | Status: SHIPPED | OUTPATIENT
Start: 2017-12-18

## 2017-12-18 RX ORDER — IBUPROFEN 200 MG
TABLET ORAL
Qty: 30 EACH | Refills: 3 | Status: SHIPPED | OUTPATIENT
Start: 2017-12-18

## 2017-12-18 RX ORDER — LORATADINE 10 MG/1
10 TABLET ORAL DAILY
Qty: 30 TAB | Refills: 2 | Status: SHIPPED | OUTPATIENT
Start: 2017-12-18

## 2017-12-18 RX ORDER — PREGABALIN 100 MG/1
100 CAPSULE ORAL 2 TIMES DAILY
Qty: 60 CAP | Refills: 2 | Status: SHIPPED | OUTPATIENT
Start: 2017-12-18 | End: 2018-04-17

## 2017-12-18 RX ORDER — HYDROXYZINE HYDROCHLORIDE 25 MG/1
25 TABLET, FILM COATED ORAL NIGHTLY PRN
Qty: 30 TAB | Refills: 2 | Status: SHIPPED | OUTPATIENT
Start: 2017-12-18

## 2017-12-18 ASSESSMENT — PAIN SCALES - GENERAL: PAINLEVEL: 6=MODERATE PAIN

## 2017-12-18 NOTE — ASSESSMENT & PLAN NOTE
Pt reports since sleeping a little better his depression has been less.  Denies suicidal ideation.  Recommended he see Counselor at [facility].

## 2017-12-18 NOTE — PROGRESS NOTES
"Jose presents to the clinic for f/u on Depression and Insomnia issues.    His PMH includes    Depression  Poorly Controlled DM-2  Diabetic Neuropathy  Depression  Insomnia  Erectile Dysfunction  Constipation  Low Back Pain  BPH and Rectal Pain     Hepatitis C, Treated by GI consultants  GERD  Elevated LFT's  Meth and Marijuana Abuse hx  Thrombocytopenia and Polycythemia   Rash     Previously Established with   Endocrine    GI Consultants  [facility] [place]- [facility]     Recent REferral Approved-   Psyhcology- Counseling- [facility] (August)  Hematology- [facility]( October)  Endocrine- again ----> [facility] October)  Psychiatry- [facility] again but for Psychiatry.(October)        Review of Records shows:    10/16/17 Clinic Visit for lab results. Pt having insomnia issues and ongoing depression. Refer Psychiatry, Refer to Endocrine.  Pt to call [facility] for counseling appt. RX for Atarax, RX for Trazodone, RX Claritin for itchy skin.    10/12/17 Referral placed to Hematologist for Jose's Polycythemia, Thrombocytopenia    8/28/17 Clinic visit for f/u on poorly controlled DM, Med Refills, Recent death of family member and Pt not taking care of self. FSBS in clinic= 401.  Labs ordered. Pt to restart DM Meds ( metformin, S/S INsulin, nightly Basaglar. Bactrim DS for spider bite wound, Atarax RX for sleep.  Referred to [facility] for counseling ( depression/Grief). Labs ordered.  5/18/16 Clinic visit, Tx for Bronchitis  5/18/16 Visit with [facility]- Reports 10 days left on 24 wk tx for hep C tx. Rash resolving F/u 4 months    Nexium 40 mg Daily RX renewal.  12/5/16 Clinic visit for Refill of Insulin and Lyrica RX     [place]  Report shows  No entries.    Chief Complaint:    HPI:      Polycythemia  Pt reports saw Hematology and \"had pint of blood removed\".  I reviewed  Hematology notes and recommendation was to look into possible   Sleep apnea as partial cause, but most likely " "his elevated RBC count is due to  Smoking.   I asked Jose to make a f/u appt w hematology as their notes states f/u in 1 month   To review labs and for recheck.  In addition I recommended a sleep study for Jose.    Primary insomnia  Pt reports he has been taking Atarax -1 and Tazodone -1 and states is sleeping \"better\". Getting \"more sleep\" now up to 5-6 hrs per night. Previously about 2-3 hrs per night.    Snores  Pt has hx of snoring but not recently.  We discussed his Hemtologist recommendation of possible look into sleep study.  Pt smokes cigarettes 1/2 ppd.  He has poor sleep, improved with Trazodone and Atarax.  Will order sleep study.      Depression  Pt reports since sleeping a little better his depression has been less.  Denies suicidal ideation.  Recommended he [facility].    DM (diabetes mellitus)  Pt has very poorly controlled DM  He has not established w Endocrine.  He needs his meds refilled.  I have given him info to make appt w [facility].  Pt denies any lows. Is taking metformin, Basaglar and at times S/S insulin.      Patient Active Problem List    Diagnosis Date Noted   • Diabetes mellitus type 2, uncontrolled (CMS-HCC) 03/11/2011     Priority: Medium   • Snores 12/18/2017   • Polycythemia 10/12/2017   • Thrombocytopenia (CMS-HCC) 10/12/2017   • Insect bite 08/28/2017   • Primary insomnia 08/28/2017   • Wound of left upper extremity 08/25/2016   • Cough 05/18/2016   • Vitamin D deficiency 10/14/2015   • Tobacco use 09/24/2015   • Diabetic polyneuropathy associated with type 2 diabetes mellitus (CMS-HCC) 09/24/2015   • Hyponatremia 08/15/2015   • Upper back pain 03/03/2015   • Depression 12/07/2012   • Erectile dysfunction 12/07/2012   • Hyperlipidemia 12/07/2012   • Hep C w/o coma, chronic (CMS-HCC) 02/23/2011   • Elevated liver enzymes 02/23/2011   • GERD (gastroesophageal reflux disease) 02/23/2011   • Constipation, chronic 02/23/2011   • BPH (benign " "prostatic hyperplasia) 02/23/2011   • DM (diabetes mellitus) (CMS-[facility]) 02/23/2011       Allergies:Nkda [no known drug allergy]    Current Outpatient Prescriptions   Medication Sig Dispense Refill   • INSULIN SYRINGE .5CC/29G (BD SAFETYGLIDE INSULIN SYRINGE) 29G X 1/2\" 0.5 ML Misc For use with Lantus 30 Each 3   • gabapentin (NEURONTIN) 800 MG tablet Take 1 Tab by mouth 3 times a day. 90 Tab 2   • hydrOXYzine HCl (ATARAX) 25 MG Tab Take 1 Tab by mouth at bedtime as needed. 30 Tab 2   • insulin glargine (BASAGLAR KWIKPEN) 100 UNIT/ML Solution Pen-injector injection Inject 35 Units as instructed every evening. 3 PEN 3   • pregabalin (LYRICA) 100 MG Cap Take 1 Cap by mouth 2 times a day for 120 days. 60 Cap 2   • trazodone (DESYREL) 50 MG Tab Take 1-2 Tabs by mouth at bedtime as needed for Sleep. 60 Tab 2   • loratadine (CLARITIN) 10 MG Tab Take 1 Tab by mouth every day. 30 Tab 2   • Insulin Pen Needle (BD PEN NEEDLE JACEY U/F) 32G X 4 MM Misc Daily and if symptoms 100 Each 2   • ibuprofen (MOTRIN) 800 MG Tab TAKE 1/2 TABLET BY MOUTH EVERY 12 HOURS AS NEEDED 30 Tab 2   • Insulin Syringe-Needle U-100 (B-D INS SYRINGE 0.5CC/31GX5/16) 31G X 5/16\" 0.5 ML Misc To use with 2 types of Insulin 4x/day 100 Each 5   • Misc. Devices Misc Insulin needles 0.5 ML 5/16 mm 31G, to use as needed with Lantus 50 Each 3   • fluticasone (FLONASE ALLERGY RELIEF) 50 MCG/ACT nasal spray Spray 1 Spray in nose every day. 16 g 11   • tamsulosin (FLOMAX) 0.4 MG capsule Take 2 Caps by mouth ONE-HALF HOUR AFTER BREAKFAST. 60 Cap 5   • esomeprazole (NEXIUM) 20 MG capsule Take 1 Cap by mouth every day. 30 Cap 5   • glucose blood (TRUE METRIX BLOOD GLUCOSE TEST) strip 1 Strip by Other route 4 times a day. 120 Strip 5   • insulin regular (HUMULIN R) 100 Unit/mL Solution Sliding scale as per Endocrine 10 mL 10   • amitriptyline (ELAVIL) 50 MG Tab Take 1 Tab by mouth every bedtime. 30 Tab 10   • Insulin Pen Needle 29G X 12.7MM Misc #100 pen needles to go " "with Insulin pens 100 Each 3   • Lancets Misc by Does not apply route 2 Times a Day. ... 60 Each 11   • metformin (GLUCOPHAGE) 1000 MG tablet TAKE 1 TABLET ORALLY 2 TIMES DAILY 60 Tab 3     No current facility-administered medications for this visit.        Social History   Substance Use Topics   • Smoking status: Current Every Day Smoker     Packs/day: 0.50     Years: 25.00     Types: Cigarettes   • Smokeless tobacco: Never Used      Comment: 1/2 ppd since 1983   • Alcohol use No       Family History   Problem Relation Age of Onset   • Cancer Mother    • Cancer Father    • Heart Disease Brother    • Lung Disease Neg Hx    • Diabetes Neg Hx    • Stroke Neg Hx        ROS:  Review of Systems   See HPI Above    Exam:  Blood pressure 102/70, pulse 100, temperature 36.4 °C (97.5 °F), resp. rate 16, height 1.854 m (6' 0.99\"), weight 82.6 kg (182 lb), SpO2 95 %.  General:  Well nourished, well developed male in moderated discomfort.  HENT:Head is grossly normal. PERRL.  Neck: Supple. Trachea is midline.  Pulmonary: Speaks in full sentences w ease.  Normal effort.    Cardiovascular: Regular rate and rhythm.  Abdomen-Abdomen is soft, No tenderness.  Upper extremities- Strong = . Good ROM  Lower extremities- neg for edema, redness, tenderness.  Neuro- A & O x 4. Speech clear and appropriate.     Current medications, allergies, and problem list reviewed with patient and updated in  [facility] today.    Assessment/Plan:  1. Polycythemia  F/u Hematology as discussed and given contact info to make the f/u appt.   2. Primary insomnia  REFERRAL TO SLEEP STUDIES    hydrOXYzine HCl (ATARAX) 25 MG Tab-refill    trazodone (DESYREL) 50 MG Tab-refill   3. Snores  REFERRAL TO SLEEP STUDIES   4. Diabetes mellitus due to underlying condition with diabetic neuropathy, with long-term current use of insulin (CMS-HCC)  INSULIN SYRINGE .5CC/29G (BD SAFETYGLIDE INSULIN SYRINGE) 29G X 1/2\" 0.5 ML Misc    insulin glargine (BASAGLAR KWIKPEN) 100 " UNIT/ML Solution Pen-injector injection   5. Diabetic polyneuropathy associated with type 2 diabetes mellitus (CMS-HCC)  pregabalin (LYRICA) 100 MG Cap    Insulin Pen Needle (BD PEN NEEDLE JACEY U/F) 32G X 4 MM Misc   6. Itchy skin  loratadine (CLARITIN) 10 MG Tab   7. Other chronic pain  gabapentin (NEURONTIN) 800 MG tablet   8. Moderate episode of recurrent major depressive disorder (CMS-[facility])  Pt given info on [facility] and to make appt.   Follow up in 6 weeks. Call or return if questions, concerns, or worsening condition.

## 2017-12-18 NOTE — ASSESSMENT & PLAN NOTE
"Pt reports he has been taking Atarax -1 and Tazodone -1 and states is sleeping \"better\". Getting \"more sleep\" now up to 5-6 hrs per night. Previously about 2-3 hrs per night.  "

## 2017-12-18 NOTE — ASSESSMENT & PLAN NOTE
Pt has very poorly controlled DM  He has not established w Endocrine.  He needs his meds refilled.  I have given him info to make appt w [facility]-Endocrine.  Pt denies any lows. Is taking metformin, Basaglar and at times S/S insulin.

## 2017-12-18 NOTE — ASSESSMENT & PLAN NOTE
"Pt reports saw Hematology and \"had pint of blood removed\".  I reviewed  Hematology notes and recommendation was to look into possible   Sleep apnea as partial cause, but most likely his elevated RBC count is due to  Smoking.   I asked Jose to make a f/u appt w hematology as their notes states f/u in 1 month   To review labs and for recheck.  In addition I recommended a sleep study for Jose.  "

## 2017-12-18 NOTE — ASSESSMENT & PLAN NOTE
Pt has hx of snoring but not recently.  We discussed his Hemtologist recommendation of possible look into sleep study.  Pt smokes cigarettes 1/2 ppd.  He has poor sleep, improved with Trazodone and Atarax.  Will order sleep study.

## 2018-02-20 DIAGNOSIS — L29.9 ITCHY SKIN: ICD-10-CM

## 2018-02-20 RX ORDER — LORATADINE 10 MG/1
TABLET ORAL
Qty: 30 TAB | Refills: 5 | Status: SHIPPED | OUTPATIENT
Start: 2018-02-20